# Patient Record
Sex: MALE
[De-identification: names, ages, dates, MRNs, and addresses within clinical notes are randomized per-mention and may not be internally consistent; named-entity substitution may affect disease eponyms.]

---

## 2018-07-07 ENCOUNTER — HOSPITAL (OUTPATIENT)
Dept: OTHER | Age: 23
End: 2018-07-07
Attending: PHYSICIAN ASSISTANT

## 2018-07-07 LAB
APPEARANCE UR: ABNORMAL
APPEARANCE UR: CLEAR
BACTERIA #/AREA URNS HPF: ABNORMAL /HPF
BILIRUB UR QL STRIP: NEGATIVE
BILIRUB UR QL: NEGATIVE
C TRACH RRNA SPEC QL NAA+PROBE: POSITIVE
COLOR UR: YELLOW
COLOR UR: YELLOW
GLUCOSE UR STRIP-MCNC: NEGATIVE MG/DL
GLUCOSE UR-MCNC: NEGATIVE MG/DL
HEMOCCULT STL QL: ABNORMAL
HGB UR QL: ABNORMAL
HYALINE CASTS #/AREA URNS LPF: ABNORMAL /LPF (ref 0–5)
KETONES UR STRIP-MCNC: NEGATIVE MG/DL
KETONES UR-MCNC: NEGATIVE MG/DL
LEUKOCYTE ESTERASE UR QL STRIP: ABNORMAL
LEUKOCYTE ESTERASE UR QL STRIP: ABNORMAL
MICROSCOPIC (MT): ABNORMAL
N GONORRHOEA RRNA SPEC QL NAA+PROBE: POSITIVE
NITRITE UR QL STRIP: NEGATIVE
NITRITE UR QL: NEGATIVE
PH UR STRIP: 5.5 UNIT (ref 5–7)
PH UR: 5.5 UNIT (ref 5–7)
PROT UR QL: 30 MG/DL
PROT UR STRIP-MCNC: 100 MG/DL
RBC #/AREA URNS HPF: ABNORMAL /HPF (ref 0–3)
SP GR UR STRIP: >1.03 (ref 1–1.03)
SP GR UR: >1.03 (ref 1–1.03)
SPECIMEN SOURCE: ABNORMAL
SPECIMEN SOURCE: ABNORMAL
SQUAMOUS #/AREA URNS HPF: ABNORMAL /HPF (ref 0–5)
UROBILINOGEN UR QL: 0.2 MG/DL (ref 0–1)
UROBILINOGEN UR STRIP-MCNC: 0.2 MG/DL (ref 0–1)
WBC #/AREA URNS HPF: >100 /HPF (ref 0–5)

## 2025-06-25 ENCOUNTER — OFFICE VISIT (OUTPATIENT)
Dept: FAMILY MEDICINE CLINIC | Facility: CLINIC | Age: 30
End: 2025-06-25
Payer: MEDICAID

## 2025-06-25 VITALS
TEMPERATURE: 98 F | DIASTOLIC BLOOD PRESSURE: 64 MMHG | OXYGEN SATURATION: 99 % | HEIGHT: 73 IN | HEART RATE: 57 BPM | SYSTOLIC BLOOD PRESSURE: 104 MMHG | WEIGHT: 134 LBS | BODY MASS INDEX: 17.76 KG/M2 | RESPIRATION RATE: 16 BRPM
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
## 2025-06-25 RX ORDER — BETAMETHASONE VALERATE 1.2 MG/G
CREAM TOPICAL
Qty: 1 EACH | Refills: 0 | Status: SHIPPED | OUTPATIENT
Start: 2025-06-25

## 2025-06-25 RX ORDER — MUPIROCIN 2 %
1 OINTMENT (GRAM) TOPICAL 3 TIMES DAILY
Qty: 22 G | Refills: 0 | Status: SHIPPED | OUTPATIENT
Start: 2025-06-25 | End: 2025-07-02

## 2025-06-25 NOTE — PROGRESS NOTES
CHIEF COMPLAINT:     Chief Complaint   Patient presents with    Insect Bite        HPI:    Alfa Soriano is a 29 year old male who presents for evaluation of a bug bites.  Per patient bug bites appeared 3 days ago. Patient denies similar reaction in the past.The affected location(s) include umbilical area, right leg, buttock area, and left posterior arm. Patient has treated bites with nothing prior to arrival.  Associated symptoms include: +pruritis. Patient concerned for possible infection to umbilical area due to drainage and pain.  Exposure: none identified.      Current Medications[1]   Past Medical History[2]   Past Surgical History[3]   Family History[4]   Short Social Hx on File[5]      REVIEW OF SYSTEMS:   GENERAL: feels well otherwise  SKIN: Per HPI.   HEENT: Denies rhinorrhea, edema of the lips or swelling of throat.  CARDIOVASCULAR: Denies chest pains or palpitations.  LUNGS: Denies shortness of breath with exertion or rest. No cough or wheezing.  LYMPH: Denies enlargement of the lymph nodes.        EXAM:   /64   Pulse 57   Temp 98.3 °F (36.8 °C) (Oral)   Resp 16   Ht 6' 1\" (1.854 m)   Wt 134 lb (60.8 kg)   SpO2 99%   BMI 17.68 kg/m²   Physical Exam  Vitals reviewed.   Constitutional:       General: He is not in acute distress.     Appearance: Normal appearance. He is not ill-appearing.   HENT:      Head: Normocephalic.      Nose: Nose normal.      Mouth/Throat:      Mouth: Mucous membranes are moist.   Eyes:      Conjunctiva/sclera: Conjunctivae normal.   Cardiovascular:      Rate and Rhythm: Normal rate.   Pulmonary:      Effort: Pulmonary effort is normal. No respiratory distress.   Musculoskeletal:         General: Normal range of motion.      Cervical back: Normal range of motion.   Skin:     General: Skin is warm and dry.      Capillary Refill: Capillary refill takes less than 2 seconds.      Findings: Lesion (Multiple pruritic erythematous wheals present with central scabbed punctum  with circumferential erythema noted to right lower leg, right thigh, bilateral buttocks, and left posterior upper arm. +yellow drainage noted from umbilical area) present.      Comments: **see attached photos**     Neurological:      General: No focal deficit present.      Mental Status: He is alert and oriented to person, place, and time.   Psychiatric:         Mood and Affect: Mood normal.                       ASSESSMENT AND PLAN:   Alfa Soriano is a 29 year old male who presents for evaluation of a rash. Findings are consistent with:    ASSESSMENT:  Encounter Diagnoses   Name Primary?    Multiple insect bites Yes    Bug bite with infection, initial encounter        PLAN: Discussed with patient exam is consistent with insect bites. Advise to use topical steroid cream. Recommend patient to use long-lasting antihistamines in the morning and Benadryl at night. Discussed with patient umbilical insect bite will be treated for infection with topical antibiotic ointment. Meds as listed below. Risk and benefits of medication discussed. Dicussed with patient to continue to monitor areas for worsening symptoms. May use cold cloths or ice, keep your fingernails short and filed smooth, and avoid scratching. Comfort measures as described in Patient Instructions.  Skin care discussed with patient. The patient is asked to see PCP in 3 days if sx's are not improving or if they worsen. The patient indicates understanding of these issues and agrees to the plan.     Meds & Refills for this Visit:  Requested Prescriptions     Signed Prescriptions Disp Refills    mupirocin 2 % External Ointment 22 g 0     Sig: Apply 1 Application topically 3 (three) times daily for 7 days.    betamethasone 0.1 % External Cream 1 each 0     Sig: Apply sparingly to the affected area twice daily            [1]   Current Outpatient Medications   Medication Sig Dispense Refill    mupirocin 2 % External Ointment Apply 1 Application topically 3 (three)  times daily for 7 days. 22 g 0    betamethasone 0.1 % External Cream Apply sparingly to the affected area twice daily 1 each 0   [2] History reviewed. No pertinent past medical history.  [3] History reviewed. No pertinent surgical history.  [4] History reviewed. No pertinent family history.  [5]   Social History  Socioeconomic History    Marital status: Single